# Patient Record
Sex: MALE | Race: WHITE | NOT HISPANIC OR LATINO | Employment: FULL TIME | URBAN - METROPOLITAN AREA
[De-identification: names, ages, dates, MRNs, and addresses within clinical notes are randomized per-mention and may not be internally consistent; named-entity substitution may affect disease eponyms.]

---

## 2022-07-10 ENCOUNTER — OFFICE VISIT (OUTPATIENT)
Dept: URGENT CARE | Facility: URGENT CARE | Age: 27
End: 2022-07-10
Payer: COMMERCIAL

## 2022-07-10 VITALS
OXYGEN SATURATION: 100 % | WEIGHT: 236 LBS | SYSTOLIC BLOOD PRESSURE: 145 MMHG | RESPIRATION RATE: 16 BRPM | TEMPERATURE: 97.8 F | DIASTOLIC BLOOD PRESSURE: 88 MMHG | HEART RATE: 102 BPM

## 2022-07-10 DIAGNOSIS — I10 HYPERTENSION, UNSPECIFIED TYPE: ICD-10-CM

## 2022-07-10 DIAGNOSIS — H61.22 IMPACTED CERUMEN OF LEFT EAR: Primary | ICD-10-CM

## 2022-07-10 PROCEDURE — 69210 REMOVE IMPACTED EAR WAX UNI: CPT | Mod: LT | Performed by: PHYSICIAN ASSISTANT

## 2022-07-10 ASSESSMENT — ENCOUNTER SYMPTOMS
VOMITING: 0
CONSTITUTIONAL NEGATIVE: 1
CARDIOVASCULAR NEGATIVE: 1
PALPITATIONS: 0
SINUS PRESSURE: 0
FREQUENCY: 0
HEADACHES: 0
GASTROINTESTINAL NEGATIVE: 1
COUGH: 0
RHINORRHEA: 0
NAUSEA: 0
WHEEZING: 0
MYALGIAS: 0
RESPIRATORY NEGATIVE: 1
DIARRHEA: 0
FEVER: 0
CHILLS: 0
DYSURIA: 0
SINUS PAIN: 0
NEUROLOGICAL NEGATIVE: 1
SORE THROAT: 0
ALLERGIC/IMMUNOLOGIC NEGATIVE: 1
CHEST TIGHTNESS: 0
SHORTNESS OF BREATH: 0
HEMATURIA: 0
MUSCULOSKELETAL NEGATIVE: 1
ABDOMINAL PAIN: 0

## 2022-07-10 NOTE — PROGRESS NOTES
Chief Complaint:    Chief Complaint   Patient presents with     Otalgia     Left ear         ASSESSMENT    Vital signs reviewed by Alfred Mujica PA-C  BP (!) 145/88   Pulse 102   Temp 97.8  F (36.6  C) (Oral)   Resp 16   Wt 107 kg (236 lb)   SpO2 100%      1. Impacted cerumen of left ear    2. Hypertension, unspecified type         PLAN    left ear has impacted cerumen.  This was lavaged, and impacted cerumen removed with curette by me.  TM visualized post impacted cerumen removal.  Patient tolerated this procedure well.  Patient verbalized significant improvement in pain.  No indication of otitis media post cerumen removal.    Fluids, vaporizer, acetaminophen, and or ibuprofen for pain.  Patient is hypertensive in clinic today.  Second BP reading was also above goal at 145/88.  Patient instructed to follow up with PCP in the next week for BP recheck.  Sooner if symptoms worsen.  Worrisome symptoms discussed with instructions to go to the ED.  Patient verbalized understanding and agreed with this plan.     LABS:    No results found for any visits on 07/10/22.      Respiratory History  occasional episodes of bronchitis    Current Meds  No current outpatient medications on file.    Problem history  There is no problem list on file for this patient.      Allergies  Allergies   Allergen Reactions     Amoxicillin      Penicillins Rash     SUBJECTIVE    HPI:Nithin Tejada is an 27 year old male who presents for possible ear infection. Symptoms include ear pain on left. Onset 2 days, gradually worsening since that time. Ear history: few episodes of otitis.    Patient is eating and drinking well.  No fever, diarrhea or vomiting.  No cough, or wheezing.    ROS:    Review of Systems   Constitutional: Negative.  Negative for chills and fever.   HENT: Positive for ear pain. Negative for ear discharge, rhinorrhea, sinus pressure, sinus pain, sneezing and sore throat.    Respiratory: Negative.  Negative for cough,  chest tightness, shortness of breath and wheezing.    Cardiovascular: Negative.  Negative for chest pain and palpitations.   Gastrointestinal: Negative.  Negative for abdominal pain, diarrhea, nausea and vomiting.   Genitourinary: Negative for dysuria, frequency, hematuria and urgency.   Musculoskeletal: Negative.  Negative for myalgias.   Skin: Negative for rash.   Allergic/Immunologic: Negative.  Negative for immunocompromised state.   Neurological: Negative.  Negative for headaches.        Family History   No family history on file.     Social History  Social History     Socioeconomic History     Marital status: Single     Spouse name: Not on file     Number of children: Not on file     Years of education: Not on file     Highest education level: Not on file   Occupational History     Not on file   Tobacco Use     Smoking status: Former Smoker     Smokeless tobacco: Never Used   Substance and Sexual Activity     Alcohol use: Not on file     Drug use: Not on file     Sexual activity: Not on file   Other Topics Concern     Not on file   Social History Narrative     Not on file     Social Determinants of Health     Financial Resource Strain: Not on file   Food Insecurity: Not on file   Transportation Needs: Not on file   Physical Activity: Not on file   Stress: Not on file   Social Connections: Not on file   Intimate Partner Violence: Not on file   Housing Stability: Not on file        OBJECTIVE     Physical Exam:     Vital signs reviewed by Alfred Mujica PA-C  BP (!) 145/88   Pulse 102   Temp 97.8  F (36.6  C) (Oral)   Resp 16   Wt 107 kg (236 lb)   SpO2 100%      Physical Exam:    Physical Exam  Vitals reviewed.   Constitutional:       General: He is not in acute distress.     Appearance: He is well-developed. He is not ill-appearing, toxic-appearing or diaphoretic.   HENT:      Head: Normocephalic and atraumatic.      Right Ear: Hearing, tympanic membrane, ear canal and external ear normal. No drainage,  swelling or tenderness. Tympanic membrane is not perforated, erythematous, retracted or bulging.      Left Ear: Hearing, tympanic membrane, ear canal and external ear normal. No drainage, swelling or tenderness. There is impacted cerumen. Tympanic membrane is not perforated, erythematous, retracted or bulging.      Nose: Congestion present. No nasal tenderness, mucosal edema or rhinorrhea.      Right Turbinates: Not enlarged or swollen.      Left Turbinates: Not enlarged or swollen.      Right Sinus: No maxillary sinus tenderness or frontal sinus tenderness.      Left Sinus: No maxillary sinus tenderness or frontal sinus tenderness.      Mouth/Throat:      Pharynx: No pharyngeal swelling, oropharyngeal exudate, posterior oropharyngeal erythema or uvula swelling.      Tonsils: No tonsillar exudate. 0 on the right. 0 on the left.   Eyes:      General: Lids are normal.         Right eye: No discharge.         Left eye: No discharge.      Conjunctiva/sclera: Conjunctivae normal.      Right eye: Right conjunctiva is not injected. No exudate.     Left eye: Left conjunctiva is not injected. No exudate.     Pupils: Pupils are equal, round, and reactive to light.   Cardiovascular:      Rate and Rhythm: Normal rate and regular rhythm.      Heart sounds: Normal heart sounds. No murmur heard.    No friction rub. No gallop.   Pulmonary:      Effort: Pulmonary effort is normal. No accessory muscle usage, respiratory distress or retractions.      Breath sounds: Normal breath sounds and air entry. No stridor, decreased air movement or transmitted upper airway sounds. No decreased breath sounds, wheezing, rhonchi or rales.   Chest:      Chest wall: No tenderness.   Abdominal:      General: Bowel sounds are normal. There is no distension.      Palpations: Abdomen is soft. Abdomen is not rigid. There is no mass.      Tenderness: There is no abdominal tenderness. There is no guarding or rebound.   Musculoskeletal:         General:  Normal range of motion.      Cervical back: Normal range of motion and neck supple.   Lymphadenopathy:      Head:      Right side of head: No submental, submandibular, tonsillar, preauricular or posterior auricular adenopathy.      Left side of head: No submental, submandibular, tonsillar, preauricular or posterior auricular adenopathy.      Cervical:      Right cervical: No superficial or posterior cervical adenopathy.     Left cervical: No superficial or posterior cervical adenopathy.   Skin:     General: Skin is warm.      Capillary Refill: Capillary refill takes less than 2 seconds.   Neurological:      Mental Status: He is alert and oriented to person, place, and time.      Cranial Nerves: No cranial nerve deficit.      Sensory: No sensory deficit.      Motor: No abnormal muscle tone.      Coordination: Coordination normal.      Deep Tendon Reflexes: Reflexes normal.   Psychiatric:         Behavior: Behavior normal. Behavior is cooperative.         Thought Content: Thought content normal.         Judgment: Judgment normal.            Alfred Mujica PA-C  7/10/2022, 6:39 PM